# Patient Record
Sex: MALE | Race: WHITE | NOT HISPANIC OR LATINO | URBAN - METROPOLITAN AREA
[De-identification: names, ages, dates, MRNs, and addresses within clinical notes are randomized per-mention and may not be internally consistent; named-entity substitution may affect disease eponyms.]

---

## 2021-06-18 ENCOUNTER — NEW PATIENT ROUTINE/VISION (OUTPATIENT)
Dept: URBAN - METROPOLITAN AREA CLINIC 49 | Facility: CLINIC | Age: 31
End: 2021-06-18

## 2021-06-18 DIAGNOSIS — H52.13: ICD-10-CM

## 2021-06-18 DIAGNOSIS — Z01.00: ICD-10-CM

## 2021-06-18 PROCEDURE — 92004 COMPRE OPH EXAM NEW PT 1/>: CPT

## 2021-06-18 PROCEDURE — 92015 DETERMINE REFRACTIVE STATE: CPT

## 2021-06-18 ASSESSMENT — VISUAL ACUITY
OD_CC: 20/40
OS_CC: 20/25

## 2021-06-18 ASSESSMENT — KERATOMETRY
OS_AXISANGLE2_DEGREES: 179
OS_K1POWER_DIOPTERS: 44.00
OD_K2POWER_DIOPTERS: 42.75
OD_K1POWER_DIOPTERS: 44.00
OD_AXISANGLE2_DEGREES: 177
OD_AXISANGLE_DEGREES: 87
OS_AXISANGLE_DEGREES: 89
OS_K2POWER_DIOPTERS: 43.00

## 2021-06-18 ASSESSMENT — TONOMETRY
OS_IOP_MMHG: 19
OD_IOP_MMHG: 19

## 2022-06-14 ENCOUNTER — ESTABLISHED PATIENT (OUTPATIENT)
Dept: URBAN - METROPOLITAN AREA CLINIC 53 | Facility: CLINIC | Age: 32
End: 2022-06-14

## 2022-06-14 DIAGNOSIS — H16.223: ICD-10-CM

## 2022-06-14 PROCEDURE — 92012 INTRM OPH EXAM EST PATIENT: CPT

## 2022-06-14 RX ORDER — LOTEPREDNOL ETABONATE 5 MG/ML: 1 SUSPENSION/ DROPS OPHTHALMIC TWICE A DAY

## 2022-06-14 ASSESSMENT — VISUAL ACUITY
OS_CC: 20/25
OD_CC: 20/20-1

## 2022-06-14 ASSESSMENT — TONOMETRY
OD_IOP_MMHG: 18
OS_IOP_MMHG: 18

## 2022-06-14 ASSESSMENT — KERATOMETRY
OS_K2POWER_DIOPTERS: 43.00
OS_K1POWER_DIOPTERS: 44.00
OD_AXISANGLE2_DEGREES: 177
OD_AXISANGLE_DEGREES: 87
OD_K2POWER_DIOPTERS: 42.75
OS_AXISANGLE_DEGREES: 89
OS_AXISANGLE2_DEGREES: 179
OD_K1POWER_DIOPTERS: 44.00

## 2022-06-14 NOTE — PATIENT DISCUSSION
Drop regiment discussed with patient. Start preservative free tears 2-3x/day or as needed. Start warm compresses OU BID. Start lid scrubs OU BID.

## 2022-06-14 NOTE — PATIENT DISCUSSION
Start cool compresses OU BID or as needed, alternate with warm compresses. Start OTC allergy drops OU Qday. Start Lotemax OU BID for 2 weeks, then Qday for 2 weeks. RTC in 4 weeks for f/u, will do full exam at this appointment.

## 2022-07-21 ENCOUNTER — ESTABLISHED PATIENT (OUTPATIENT)
Dept: URBAN - METROPOLITAN AREA CLINIC 49 | Facility: CLINIC | Age: 32
End: 2022-07-21

## 2022-07-21 DIAGNOSIS — H16.223: ICD-10-CM

## 2022-07-21 DIAGNOSIS — H10.13: ICD-10-CM

## 2022-07-21 DIAGNOSIS — H52.13: ICD-10-CM

## 2022-07-21 DIAGNOSIS — Z01.01: ICD-10-CM

## 2022-07-21 PROCEDURE — 92015 DETERMINE REFRACTIVE STATE: CPT

## 2022-07-21 PROCEDURE — 92014 COMPRE OPH EXAM EST PT 1/>: CPT

## 2022-07-21 ASSESSMENT — VISUAL ACUITY
OD_CC: 20/20
OU_CC: J1+
OS_CC: 20/20

## 2022-07-21 ASSESSMENT — KERATOMETRY
OS_AXISANGLE_DEGREES: 89
OD_K2POWER_DIOPTERS: 42.75
OD_AXISANGLE_DEGREES: 87
OS_K1POWER_DIOPTERS: 44.00
OS_AXISANGLE2_DEGREES: 179
OD_AXISANGLE2_DEGREES: 177
OS_K2POWER_DIOPTERS: 43.00
OD_K1POWER_DIOPTERS: 44.00

## 2022-07-21 ASSESSMENT — TONOMETRY
OS_IOP_MMHG: 18
OD_IOP_MMHG: 18

## 2022-07-21 NOTE — PATIENT DISCUSSION
Improved. Drop regiment discussed with patient. Continue preservative free tears 2-3x/day or as needed. Continue warm compresses OU BID. Continue lid scrubs OU BID. Start Tyrvaya spray in both nostrils twice a day. Gave patient information on Tyrvaya. Discussed with patient to call back if not working in a few weeks and he can come  [employer].

## 2022-07-21 NOTE — PATIENT DISCUSSION
Continue cool compresses OU BID or as needed, alternate with warm compresses. Continue OTC allergy drops OU Qday. Discontinue Lotemax OU BID for 2 weeks, then Qday for 2 weeks.

## 2022-07-21 NOTE — PATIENT DISCUSSION
Patient states he feels that dryness is not completely improved. Patient states that at the end of the day symptoms are worse.